# Patient Record
Sex: FEMALE | Race: WHITE
[De-identification: names, ages, dates, MRNs, and addresses within clinical notes are randomized per-mention and may not be internally consistent; named-entity substitution may affect disease eponyms.]

---

## 2017-03-26 ENCOUNTER — HOSPITAL ENCOUNTER (EMERGENCY)
Dept: HOSPITAL 59 - ER | Age: 62
Discharge: HOME | End: 2017-03-26
Payer: COMMERCIAL

## 2017-03-26 DIAGNOSIS — J20.9: Primary | ICD-10-CM

## 2017-03-26 PROCEDURE — 99283 EMERGENCY DEPT VISIT LOW MDM: CPT

## 2017-03-26 PROCEDURE — 71020: CPT

## 2017-03-26 NOTE — EMERGENCY DEPARTMENT RECORD
History of Present Illness





- General


Chief Complaint: Cough


Stated Complaint: CHEST CONGESTION


Time Seen by Provider: 03/26/17 20:07


Source: Patient


Mode of Arrival: Ambulatory


Limitations: No limitations





- History of Present Illness


Initial Comments: 


pt has been sick for a month with ear infection for which she got antibiotics 

and has now developed a cough that is productive.  she thinks she has 

bronchitis as she had it before.  she thinks she had a fever this morning.  she 

denies sob, body aches or other symptoms


MD Complaint: Cough, Nasal congestion


Onset/Timing: 3


-: Days(s)


Consistency: Getting worse


Improves With: Nothing


Worsens With: Nothing


Associated Symptoms: Cough, Fever





- Related Data


 Previous Rx's











 Medication  Instructions  Recorded


 


Azithromycin [Zithromax] 250 mg PO DAILY #4 tab 03/26/17


 


Benzonatate [Tessalon] 1 cap PO Q8H PRN #14 cap 03/26/17











 Allergies











Allergy/AdvReac Type Severity Reaction Status Date / Time


 


Sulfa (Sulfonamide Allergy  RASH Unverified 02/22/17 08:45





Antibiotics)     














Travel Screening





- Travel/Exposure Within Last 30 Days


Have you traveled within the last 30 days?: No





Past Medical History





- SOCIAL HISTORY


Smoking Status: Never smoker


Alcohol Use: None





- RESPIRATORY


Hx Respiratory Disorders: Yes


Hx Bronchitis: Yes (hx)





- CARDIOVASCULAR


Hx Cardio Disorders: No





- NEURO


Hx Neuro Disorders: No





- GI


Hx GI Disorders: No





- 


Hx Genitourinary Disorders: No





- ENDOCRINE


Hx Endocrine Disorders: No





- MUSCULOSKELETAL


Hx Musculoskeletal Disorders: No





- PSYCH


Hx Psych Problems: No





- HEMATOLOGY/ONCOLOGY


Hx Hematology/Oncology Disorders: No





Family Medical History


Any Significant Family History?: Yes


Hx Diabetes: Brother/Sister


Hx Heart Disease: Father





Course





 Vital Signs











  03/26/17





  19:50


 


Temperature 99.7 F H


 


Pulse Rate [ 101 H





Pulse Ox Probe] 


 


Respiratory 18





Rate 


 


Blood Pressure 166/88





[Left Arm] 


 


Pulse Ox 93 L














Medical Decision Making





- Management Options


MDM Management: Additional Work-up Planned (e.g. ADM/Transfer/OP Study)





- Data Complexity


MDM Data: X-Ray Ordered and/or Reviewed





- Radiology Data


Radiology results: Report reviewed, Image reviewed





Disposition


Disposition: Discharge


Clinical Impression: 


 Bronchitis


Disposition: Home, Self-Care


Condition: (1) Good


Instructions:  Acute Bronchitis (ED)


Additional Instructions: 


follow uo with family doctor.  return sooner if worse.


Prescriptions: 


Benzonatate [Tessalon] 1 cap PO Q8H PRN #14 cap


 PRN Reason: Cough


Azithromycin [Zithromax] 250 mg PO DAILY #4 tab


Forms:  Patient Portal Access

## 2017-03-30 NOTE — EMERGENCY DEPARTMENT RECORD
History of Present Illness





- General


Chief Complaint: Cough


Stated Complaint: CHEST CONGESTION


Time Seen by Provider: 03/26/17 20:07


Source: Patient


Mode of Arrival: Ambulatory


Limitations: No limitations





- History of Present Illness


Onset/Timing: 3


-: Days(s)


Consistency: Getting worse


Improves With: Nothing


Worsens With: Nothing


Associated Symptoms: Cough, Fever





- Related Data


 Previous Rx's











 Medication  Instructions  Recorded


 


Azithromycin [Zithromax] 250 mg PO DAILY #4 tab 03/26/17


 


Benzonatate [Tessalon] 1 cap PO Q8H PRN #14 cap 03/26/17











 Allergies











Allergy/AdvReac Type Severity Reaction Status Date / Time


 


Sulfa (Sulfonamide Allergy  RASH Unverified 02/22/17 08:45





Antibiotics)     














Travel Screening





- Travel/Exposure Within Last 30 Days


Have you traveled within the last 30 days?: No





Review of Systems


Reviewed: No additional complaints except as noted below


Constitutional: Reports: As per HPI.  Denies: Chills, Fever, Malaise, Night 

sweats, Weakness, Weight change


Eyes: Reports: As per HPI.  Denies: Eye discharge, Eye pain, Photophobia, 

Vision change


ENT: Reports: As per HPI.  Denies: Congestion, Dental pain, Ear pain, Epistaxis

, Hearing loss, Throat pain


Respiratory: Reports: As per HPI.  Denies: Cough, Dyspnea, Hemoptysis, Stridor, 

Wheezes


Cardiovascular: Reports: As per HPI.  Denies: Arrhythmia, Chest pain, Dyspnea 

on exertion, Edema, Murmurs, Orthopnea, Palpitations, Paroxysmal nocturnal 

dyspnea, Rheumatic Fever, Syncope


Endocrine: Reports: As per HPI.  Denies: Fatigue, Heat or cold intolerance, 

Polydipsia, Polyuria


Gastrointestinal: Reports: As per HPI.  Denies: Abdominal pain, Constipation, 

Diarrhea, Hematemesis, Hematochezia, Melena, Nausea, Vomiting


Genitourinary: Reports: As per HPI.  Denies: Abnormal menses, Discharge, 

Dyspareunia, Dysuria, Frequency, Hematuria, Incontinence, Retention, Urgency


Musculoskeletal: Reports: As per HPI.  Denies: Arthralgia, Back pain, Gout, 

Joint swelling, Myalgia, Neck pain


Skin: Reports: As per HPI.  Denies: Bruising, Change in color, Change in hair/

nails, Lesions, Pruritus, Rash


Neurological: Reports: As per HPI.  Denies: Abnormal gait, Confusion, Headache, 

Numbness, Paresthesias, Seizure, Tingling, Tremors, Vertigo, Weakness


Psychiatric: Reports: As per HPI.  Denies: Anxiety, Auditory hallucinations, 

Depression, Homicidal thoughts, Suicidal thoughts, Visual hallucinations


Hematological/Lymphatic: Reports: As per HPI.  Denies: Anemia, Blood Clots, 

Easy bleeding, Easy bruising, Swollen glands





Past Medical History





- SOCIAL HISTORY


Smoking Status: Never smoker


Alcohol Use: None





- RESPIRATORY


Hx Respiratory Disorders: Yes


Hx Bronchitis: Yes (hx)





- CARDIOVASCULAR


Hx Cardio Disorders: No





- NEURO


Hx Neuro Disorders: No





- GI


Hx GI Disorders: No





- 


Hx Genitourinary Disorders: No





- ENDOCRINE


Hx Endocrine Disorders: No





- MUSCULOSKELETAL


Hx Musculoskeletal Disorders: No





- PSYCH


Hx Psych Problems: No





- HEMATOLOGY/ONCOLOGY


Hx Hematology/Oncology Disorders: No





Family Medical History


Any Significant Family History?: Yes


Hx Diabetes: Brother/Sister


Hx Heart Disease: Father





Physical Exam





- General


General Appearance: Alert, Oriented x3, Cooperative, Mild distress


Limitations: No limitations





- Head


Head exam: Normal inspection





- Eye


Eye exam: Normal appearance, PERRL, EOMI


Pupils: Normal accommodation





- ENT


ENT exam: Normal exam, Mucous membranes moist, Normal external ear exam, Normal 

orophraynx


Ear exam: Normal external inspection.  negative: External canal tenderness


Nasal Exam: Normal inspection.  negative: Discharge, Sinus tenderness


Mouth exam: Normal external inspection, Tongue normal


Teeth exam: Normal inspection.  negative: Dental caries


Throat exam: Normal inspection.  negative: Tonsillar erythema, Tonsillar exudate





- Neck


Neck exam: Normal inspection, Full ROM.  negative: Tenderness





- Respiratory


Respiratory exam: Normal lung sounds bilaterally.  negative: Respiratory 

distress





- Cardiovascular


Cardiovascular Exam: Regular rate, Normal rhythm, Normal heart sounds





- GI/Abdominal


GI/Abdominal exam: Soft, Normal bowel sounds.  negative: Tenderness





- Rectal


Rectal exam: Deferred





- 


 exam: Deferred





- Extremities


Extremities exam: Normal inspection, Full ROM, Normal capillary refill.  

negative: Tenderness





- Back


Back exam: Reports: Normal inspection, Full ROM.  Denies: Muscle spasm, Rash 

noted, Tenderness





- Neurological


Neurological exam: Alert, Normal gait, Oriented X3, Reflexes normal





- Psychiatric


Psychiatric exam: Normal affect, Normal mood





- Skin


Skin exam: Dry, Intact, Normal color, Warm





Course





 Vital Signs











  03/26/17 03/26/17





  19:50 21:01


 


Temperature 99.7 F H 


 


Pulse Rate  96 H


 


Pulse Rate [ 101 H 





Pulse Ox Probe]  


 


Respiratory 18 18





Rate  


 


Blood Pressure 166/88 





[Left Arm]  


 


Pulse Ox 93 L 93 L














Disposition


Clinical Impression: 


 Bronchitis


Disposition: Home, Self-Care


Condition: (1) Good


Instructions:  Acute Bronchitis (ED)


Additional Instructions: 


follow uo with family doctor.  return sooner if worse.


Prescriptions: 


Benzonatate [Tessalon] 1 cap PO Q8H PRN #14 cap


 PRN Reason: Cough


Azithromycin [Zithromax] 250 mg PO DAILY #4 tab


Forms:  Patient Portal Access

## 2017-03-30 NOTE — RADIOLOGY REPORT
EXAM:  CHEST, TWO VIEWS



HISTORY:  ACUTE COUGH TIMES FOUR DAYS.   



TECHNIQUE:  Two views of the chest were provided without comparison 
examination.  



FINDINGS:  The cardiomediastinal silhouette is within normal limits for size 
and contour.  The addy appear unremarkable.  



There is no radiographic evidence of a focal infiltrate or pleural effusion.  



No pneumothorax is noted.  



IMPRESSION:  

NO RADIOGRAPHIC EVIDENCE OF AN ACUTE INTRATHORACIC PROCESS.  



JOB NUMBER:  [id number]
MTDD

## 2018-04-27 ENCOUNTER — HOSPITAL ENCOUNTER (OUTPATIENT)
Dept: HOSPITAL 59 - HOP | Age: 63
Discharge: HOME | End: 2018-04-27
Attending: INTERNAL MEDICINE
Payer: COMMERCIAL

## 2018-04-27 DIAGNOSIS — Z79.84: ICD-10-CM

## 2018-04-27 DIAGNOSIS — I10: ICD-10-CM

## 2018-04-27 DIAGNOSIS — E11.9: ICD-10-CM

## 2018-04-27 DIAGNOSIS — Z12.11: Primary | ICD-10-CM

## 2018-04-27 DIAGNOSIS — D12.0: ICD-10-CM

## 2018-04-27 DIAGNOSIS — E78.00: ICD-10-CM

## 2018-04-30 NOTE — OPERATIVE NOTE
DATE OF SURGERY: 04/27/2018



SURGEON: Rissa Mcneil MD  



OPERATION: COLONOSCOPY. 



INDICATIONS: This is a 63-year-old female with average risk for colorectal cancer who presented for 
screening colonoscopy. 



POSTOPERATIVE DIAGNOSES:

1. A 6 mm sessile polyp in the cecum that was removed by cold snare. 

2. Otherwise normal colon. 



ANESTHESIA: Sedation is per Anesthesia. Pulse oximetry was monitored throughout the procedure to 
maintain O2 saturation of 90% or greater. Supplemental oxygen was administered via nasal cannula. 
Cardiac and vital signs were monitored throughout the duration of the procedure, and they were 
stable. 



The procedure of colonoscopy and risks and alternatives of the procedure, including the risk of 
bleeding and perforation, among others, were explained to the patient who voiced understanding and 
agreed to have the procedure done. Physical examination was performed, and the patient was found 
stable for sedation. 



PROCEDURE: The patient was placed in the left lateral position. Sedation was initiated. A digital 
rectal exam was performed and showed some mild external hemorrhoids with no palpable rectal masses. 
An Olympus PCF-180AL colonoscope was then inserted into the rectum under direct visualization. It 
was advanced to the cecum without difficulty. The ileocecal valve and appendiceal orifice were 
identified and photographed. The colonic mucosa was carefully examined upon introduction of the 
colonoscope. There were no lesions noted. In the cecum was a 6 mm sessile polyp that was noted and 
was removed by cold snare. The ileocecal valve was intubated and terminal ileal mucosa was inspected 
for about 10 cm and it appeared normal. The colonoscope was then withdrawn while carefully examining 
the colonic mucosal surfaces. No other lesions were noted. In the rectum, retroflexion was performed 
and grade 1 internal hemorrhoids were noted. The colonoscope was then withdrawn and the procedure 
was terminated. The patient tolerated the procedure well without any immediate complications. She 
remained with stable vital signs and was transferred to the recovery room.



RECOMMENDATIONS: 

1. We will follow up on the histology of the polyp. 

2. The patient is to have a repeat colonoscopy in 3 or 5 years depending on the histology of the 
polyp.  



Thank you for allowing me to participate in the care of your patient. CC: EDWIN Carrera

## 2018-12-11 ENCOUNTER — HOSPITAL ENCOUNTER (OUTPATIENT)
Dept: HOSPITAL 59 - SUR | Age: 63
Discharge: HOME | End: 2018-12-11
Attending: ORTHOPAEDIC SURGERY
Payer: COMMERCIAL

## 2018-12-11 DIAGNOSIS — M65.341: ICD-10-CM

## 2018-12-11 DIAGNOSIS — E78.00: ICD-10-CM

## 2018-12-11 DIAGNOSIS — G56.01: Primary | ICD-10-CM

## 2018-12-11 DIAGNOSIS — E11.9: ICD-10-CM

## 2018-12-11 DIAGNOSIS — I10: ICD-10-CM

## 2018-12-11 DIAGNOSIS — M65.321: ICD-10-CM

## 2018-12-11 PROCEDURE — 26055 INCISE FINGER TENDON SHEATH: CPT

## 2018-12-11 PROCEDURE — 64721 CARPAL TUNNEL SURGERY: CPT

## 2018-12-11 PROCEDURE — 64727 INTERNAL NEUROLYSIS: CPT

## 2018-12-11 PROCEDURE — 01810 ANES PX NRV MUSC F/ARM WRST: CPT

## 2018-12-12 NOTE — OPERATIVE NOTE
DATE OF SURGERY: 12/11/2018 



Surgeon: Evelio Garcia DO



PREOPERATIVE DIAGNOSES: 

1. 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 

2. Trigger finger of the right index finger. 

3. Trigger finger of the right ring finger. 



POSTOPERATIVE DIAGNOSES: 

1. 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 

2. Trigger finger of the right index finger. 

3. Trigger finger of the right ring finger. 



OPERATION: 

1. Decompression of right median nerve at the wrist using 3.5 loop magnification. 

2. Tenotomy A1 pulley right index finger. 

3. Tenotomy A1 pulley right ring finger. 



DESCRIPTION OF PROCEDURE: This 63-year-old female was taken to the operating room and placed in the 
supine position on the operating room table. General anesthesia was induced. The right upper 
extremity was elevated. It was prepped with Hibiclens and draped in the usual sterile fashion. It 
was exsanguinated and the tourniquet inflated to 250 mmHg. 



A palmar incision was utilized following the hypothenar crease from the level of the base of the web 
space of the thumb to the flexor crease of the wrist. Dissection was carried down through the skin 
and subcutaneous tissue. Palmar fascia divided in line with the skin incision. The flexor 
retinaculum was identified, punctured, and split to its proximal margin. Then with the contents of 
the carpal tunnel under direct vision, the transverse carpal ligament was transected along its ulnar 
border. The radial flap was raised to expose the entire median nerve under the transverse carpal 
ligament. The recurrent motor branch of the median nerve was identified and found to be normal. The 
wound was irrigated and the wound subsequently closed with interrupted 6-0 nylon suture. 



We then directed our attention to the index finger, and a longitudinal incision was made in the palm 
of the hand overlying the 2nd metacarpal head and neck, and dissection was carried down through the 
skin and subcutaneous tissue. Total length of the incision approximately 1.5 cm. The proximal edge 
of the A1 pulley was easily identified, and then it was incised from proximal to distal under direct 
vision to release the pulley. The finger was taken through range of motion and no catching or 
locking of the finger was identified. The wound was irrigated and closed with interrupted 6-0 nylon 
suture. 



The ring finger was subsequently addressed by making a similar type of incision over the 4th 
metacarpal head and neck, and dissection was carried down through the skin and subcutaneous tissue. 
The proximal edge of the A1 pulley was identified, and it was then incised from proximal to distal 
under direct vision. Once this had been completely decompressed, the finger was taken through range 
of motion and no catching or locking of the finger was identified. This wound was also irrigated and 
closed with interrupted 6-0 nylon suture. Sterile dressings were applied with a plaster splint 
immobilization with the wrist in slight dorsiflexion and the thumb in an adducted position. 



GROSS PATHOLOGY: This patient demonstrated mild hourglass deformity and hyperemia of the median 
nerve under the transverse carpal ligament. In addition, there was nodularity of the sublimis tendon 
of the index finger and ring finger but no defect or no disruption of the tendon fibers was 
identified in either index or ring finger.  CC: LEW Dorado

## 2019-09-05 ENCOUNTER — HOSPITAL ENCOUNTER (OUTPATIENT)
Dept: HOSPITAL 59 - SUR | Age: 64
Discharge: HOME | End: 2019-09-05
Attending: ORTHOPAEDIC SURGERY
Payer: COMMERCIAL

## 2019-09-05 DIAGNOSIS — E11.9: ICD-10-CM

## 2019-09-05 DIAGNOSIS — I10: ICD-10-CM

## 2019-09-05 DIAGNOSIS — M65.312: ICD-10-CM

## 2019-09-05 DIAGNOSIS — E78.00: ICD-10-CM

## 2019-09-05 DIAGNOSIS — G56.02: Primary | ICD-10-CM

## 2019-09-06 NOTE — OPERATIVE NOTE
DATE OF SURGERY: 09/05/2019



SURGEON: Evelio Garcia DO



PREOPERATIVE DIAGNOSES: 

1. Carpal tunnel syndrome of the left wrist. 

2. Trigger finger of the left thumb. 



POSTOPERATIVE DIAGNOSES:

1. Carpal tunnel syndrome of the left wrist. 

2. Trigger finger of the left thumb. 



OPERATION: 

1. Decompression left median nerve of the wrist using 3.5 loop magnification. 

2. Tenotomy A1 pulley left thumb. 



DESCRIPTION OF PROCEDURE: This 64-year-old female was taken to the operating 
room and placed in the supine position on the operating room table. General 
anesthetic was administered. The left upper extremity was elevated, prepped with
Hibiclens, and draped in the usual sterile fashion. It was exsanguinated and the
tourniquet inflated to 250 mmHg. 



We addressed the thumb first by making a transverse incision in the flexor 
crease of the MCP joint of the left thumb on the volar surface of the thumb. 
Dissection was carried down through the skin and subcutaneous tissue. 
Neurovascular structures were identified and retracted safely out of the way to 
expose the A1 pulley. This was then incised in line with the epithelial fibers. 
The tendon was then taken through range of motion and no clicking, catching, or 
locking of the thumb was identified. The tendon itself appeared to be grossly 
intact with small nodularity being noted within the tendon. This was not further
disturbed. The wound was then copiously irrigated. Hemostasis obtained with the 
electrocautery. The wound closed with 6-0 nylon suture. 



We then directed our attention to the carpal tunnel. An incision was made 
following the hypothenar crease from the level of the base of the webspace of 
the thumb to the flexor crease of the wrist. Dissection was carried down through
the skin and subcutaneous tissue. Hemostasis obtained with the electrocautery. 
The palmar fascia was divided in line with the skin incision. We then identified
the flexor retinaculum, which was punctured and then split to its proximal 
margin. Then, with the contents of the carpal tunnel under direct vision, the 
transverse carpal ligament was transected along its ulnar border. The radial 
flap was raised to expose the entire median nerve under the transverse carpal 
ligament. The recurrent motor branch of the median nerve was identified and 
found to be intact. The nerve itself appeared to be grossly normal. With the 
nerve completely freed up, the tourniquet was released and hemostasis obtained 
with the electrocautery. The wound closed with interrupted 6-0 nylon suture. 
Sterile dressings were applied to both wounds with the wrist in slight 
dorsiflexion and the thumb in a slightly adducted position. 



GROSS PATHOLOGY: This patient had a nodularity of the FBL of the left thumb and 
the tendon was otherwise intact. With carpal tunnel decompression, the nerve was
inspected and no gross pathology of the nerve was identified. The recurrent 
motor branch was seen to be normal. 

MTDD